# Patient Record
Sex: MALE | Race: WHITE | NOT HISPANIC OR LATINO | URBAN - NONMETROPOLITAN AREA
[De-identification: names, ages, dates, MRNs, and addresses within clinical notes are randomized per-mention and may not be internally consistent; named-entity substitution may affect disease eponyms.]

---

## 2020-08-18 ENCOUNTER — OFFICE (OUTPATIENT)
Dept: URBAN - NONMETROPOLITAN AREA CLINIC 15 | Facility: CLINIC | Age: 55
End: 2020-08-18

## 2020-08-18 VITALS
HEIGHT: 71 IN | SYSTOLIC BLOOD PRESSURE: 115 MMHG | RESPIRATION RATE: 20 BRPM | HEART RATE: 66 BPM | DIASTOLIC BLOOD PRESSURE: 63 MMHG | TEMPERATURE: 96.7 F | WEIGHT: 266 LBS | OXYGEN SATURATION: 96 %

## 2020-08-18 DIAGNOSIS — I25.10 ATHEROSCLEROTIC HEART DISEASE OF NATIVE CORONARY ARTERY WITH: ICD-10-CM

## 2020-08-18 DIAGNOSIS — R76.8 OTHER SPECIFIED ABNORMAL IMMUNOLOGICAL FINDINGS IN SERUM: ICD-10-CM

## 2020-08-18 DIAGNOSIS — E11.9 TYPE 2 DIABETES MELLITUS WITHOUT COMPLICATIONS: ICD-10-CM

## 2020-08-18 DIAGNOSIS — K59.09 OTHER CONSTIPATION: ICD-10-CM

## 2020-08-18 DIAGNOSIS — Z72.0 TOBACCO USE: ICD-10-CM

## 2020-08-18 DIAGNOSIS — N18.4 CHRONIC KIDNEY DISEASE, STAGE 4 (SEVERE): ICD-10-CM

## 2020-08-18 DIAGNOSIS — E66.9 OBESITY, UNSPECIFIED: ICD-10-CM

## 2020-08-18 DIAGNOSIS — J44.9 CHRONIC OBSTRUCTIVE PULMONARY DISEASE, UNSPECIFIED: ICD-10-CM

## 2020-08-18 DIAGNOSIS — Z86.010 PERSONAL HISTORY OF COLONIC POLYPS: ICD-10-CM

## 2020-08-18 PROCEDURE — 99203 OFFICE O/P NEW LOW 30 MIN: CPT | Mod: 95

## 2022-03-10 ENCOUNTER — OFFICE (OUTPATIENT)
Dept: URBAN - NONMETROPOLITAN AREA CLINIC 9 | Facility: CLINIC | Age: 57
End: 2022-03-10

## 2022-03-10 VITALS
DIASTOLIC BLOOD PRESSURE: 65 MMHG | SYSTOLIC BLOOD PRESSURE: 105 MMHG | WEIGHT: 237 LBS | HEART RATE: 70 BPM | HEIGHT: 71 IN | RESPIRATION RATE: 18 BRPM

## 2022-03-10 DIAGNOSIS — E11.9 TYPE 2 DIABETES MELLITUS WITHOUT COMPLICATIONS: ICD-10-CM

## 2022-03-10 DIAGNOSIS — I25.10 ATHEROSCLEROTIC HEART DISEASE OF NATIVE CORONARY ARTERY WITH: ICD-10-CM

## 2022-03-10 DIAGNOSIS — R10.13 EPIGASTRIC PAIN: ICD-10-CM

## 2022-03-10 DIAGNOSIS — K59.00 CONSTIPATION, UNSPECIFIED: ICD-10-CM

## 2022-03-10 DIAGNOSIS — J44.9 CHRONIC OBSTRUCTIVE PULMONARY DISEASE, UNSPECIFIED: ICD-10-CM

## 2022-03-10 DIAGNOSIS — Z72.0 TOBACCO USE: ICD-10-CM

## 2022-03-10 DIAGNOSIS — E66.9 OBESITY, UNSPECIFIED: ICD-10-CM

## 2022-03-10 DIAGNOSIS — N18.4 CHRONIC KIDNEY DISEASE, STAGE 4 (SEVERE): ICD-10-CM

## 2022-03-10 PROCEDURE — 99214 OFFICE O/P EST MOD 30 MIN: CPT | Mod: 95

## 2022-03-10 NOTE — SERVICEHPINOTES
Reji Urbano Jr   is a   56   year old  male   who is here today for  Epigastric pain, nausea, constipation. He describes frequent epigastric pain and nausea. He does note some early satiety. He has a history of colon polyps and is due for surveillance colonoscopy. He is ready to schedule both EGD and colonoscopy today. He notes some intermittent dysphagia. CTA of the neck showed hemodynamically significant narrowing of the proximal left internal carotid artery with a diameter of 60%. Bilateral enhancing parotid gland masses. These are likely lymph nodes. The largest measures 17 mm in maximal diameter. Suggest bilateral carotid ultrasound. He saw vascular surgeon, Dr. Luna in consultation. The patient reports that the plan is to plan surveillance until the stenosis is 70%. Patient reports that he has not had any imaging of his parotids. He is going to have imaging of his cervical spine soon.He reports significant weight loss due to lifestyle changes. He has started cutting out fatty foods. He is trying to exercise. He has a history of chronic kidney disease.  He has had an AV fistula but has not required dialysis as of yet.  His creatinine has actually improved a bit.  He recently had a CT a that showed some flow-limiting stenosis in the carotid arteries as well as some He denies any dysphagia or odynophagia.  He has a remote history of hepatitis C successfully treated with subsequent SVR.  His last HCV  RNA was undetectable.  Recent CMP showed normal AST and ALT.  His alkaline phosphatase was elevated at 170. .

## 2022-03-10 NOTE — SERVICENOTES
This visit was completed via ZOOM due to the restrictions of the COVID-19 pandemic. All issues as below were discussed and addressed.  Patient verbally consented to visit. exam was performed with the assistance Aisha Andrews LPN , who was present in the exam room with patient
Start time:10:45
End time:11:00

## 2022-04-19 ENCOUNTER — ON CAMPUS - OUTPATIENT (OUTPATIENT)
Dept: URBAN - NONMETROPOLITAN AREA HOSPITAL 28 | Facility: HOSPITAL | Age: 57
End: 2022-04-19
Payer: MEDICARE

## 2022-04-19 DIAGNOSIS — D12.0 BENIGN NEOPLASM OF CECUM: ICD-10-CM

## 2022-04-19 DIAGNOSIS — D12.3 BENIGN NEOPLASM OF TRANSVERSE COLON: ICD-10-CM

## 2022-04-19 DIAGNOSIS — R10.13 EPIGASTRIC PAIN: ICD-10-CM

## 2022-04-19 DIAGNOSIS — K62.1 RECTAL POLYP: ICD-10-CM

## 2022-04-19 DIAGNOSIS — R68.81 EARLY SATIETY: ICD-10-CM

## 2022-04-19 DIAGNOSIS — K29.80 DUODENITIS WITHOUT BLEEDING: ICD-10-CM

## 2022-04-19 DIAGNOSIS — R11.0 NAUSEA: ICD-10-CM

## 2022-04-19 DIAGNOSIS — D12.2 BENIGN NEOPLASM OF ASCENDING COLON: ICD-10-CM

## 2022-04-19 DIAGNOSIS — K29.50 UNSPECIFIED CHRONIC GASTRITIS WITHOUT BLEEDING: ICD-10-CM

## 2022-04-19 DIAGNOSIS — Z86.010 PERSONAL HISTORY OF COLONIC POLYPS: ICD-10-CM

## 2022-04-19 PROCEDURE — 45385 COLONOSCOPY W/LESION REMOVAL: CPT | Performed by: INTERNAL MEDICINE

## 2022-04-19 PROCEDURE — 43239 EGD BIOPSY SINGLE/MULTIPLE: CPT | Performed by: INTERNAL MEDICINE

## 2022-06-09 ENCOUNTER — OFFICE (OUTPATIENT)
Dept: URBAN - NONMETROPOLITAN AREA CLINIC 9 | Facility: CLINIC | Age: 57
End: 2022-06-09

## 2022-06-09 VITALS
RESPIRATION RATE: 19 BRPM | DIASTOLIC BLOOD PRESSURE: 68 MMHG | WEIGHT: 222 LBS | SYSTOLIC BLOOD PRESSURE: 117 MMHG | HEART RATE: 80 BPM | HEIGHT: 71 IN

## 2022-06-09 DIAGNOSIS — Z59.41 FOOD INSECURITY: ICD-10-CM

## 2022-06-09 DIAGNOSIS — E11.9 TYPE 2 DIABETES MELLITUS WITHOUT COMPLICATIONS: ICD-10-CM

## 2022-06-09 DIAGNOSIS — N18.4 CHRONIC KIDNEY DISEASE, STAGE 4 (SEVERE): ICD-10-CM

## 2022-06-09 DIAGNOSIS — J44.9 CHRONIC OBSTRUCTIVE PULMONARY DISEASE, UNSPECIFIED: ICD-10-CM

## 2022-06-09 DIAGNOSIS — Z59.819 HOUSING INSTABILITY, HOUSED UNSPECIFIED: ICD-10-CM

## 2022-06-09 DIAGNOSIS — R76.8 OTHER SPECIFIED ABNORMAL IMMUNOLOGICAL FINDINGS IN SERUM: ICD-10-CM

## 2022-06-09 DIAGNOSIS — D12.6 BENIGN NEOPLASM OF COLON, UNSPECIFIED: ICD-10-CM

## 2022-06-09 DIAGNOSIS — I25.10 ATHEROSCLEROTIC HEART DISEASE OF NATIVE CORONARY ARTERY WITH: ICD-10-CM

## 2022-06-09 DIAGNOSIS — K59.09 OTHER CONSTIPATION: ICD-10-CM

## 2022-06-09 PROCEDURE — 99215 OFFICE O/P EST HI 40 MIN: CPT | Mod: 95

## 2022-06-09 SDOH — ECONOMIC STABILITY - FOOD INSECURITY: FOOD INSECURITY: Z59.41

## 2022-06-09 SDOH — ECONOMIC STABILITY - HOUSING INSECURITY: HOUSING INSTABILITY UNSPECIFIED: Z59.819

## 2022-06-09 NOTE — SERVICENOTES
This visit was completed via ZOOM due to the restrictions of the COVID-19 pandemic. All issues as below were discussed and addressed.  Patient verbally consented to visit. exam was performed with the assistance Aisha Andrews LPN , who was present in the exam room with patient
Start time:09:45
End time:10:02

## 2022-06-09 NOTE — SERVICEHPINOTES
Reji Urbano Jr   is a   57   year old  male   who is here today for follow up of epigastric pain, constipation. He  continues to have weight loss  Some of this is for diet change however patient reports significant food insecurity.  He has difficulty getting fresh food in groceries due to his finances.  He reports receiving 1200 dollars per month from disability/social security. He lives in [place] and travels to [place] for his doctors appointments. between travel expenses, rent, utilities, medications, patient reports he only has about 20 dollars per week for food.  He has tried the food pantry with little success.  We had a long discussion about his current mental health.  He apologized for making the statement about self-harm.  He says that he was just frustrated due to increased co-pay and his lack of financial resources.   on further questioning, patient reports that he is concerned about his current living situation.  He has not met with a  or .  He denies having any type of plan for self-harm.  He tells me that it would be of character and against his Yazidism to commit self-harm.  He denies owning a gun.  I told the patient that I would be happy to help in any way possible to ensure that his situation  improves.  I have already spoken with Dr. Shanell Lee who has given his information and telephone number to the  for [facility]. The patient voiced appreciation for our assistance and said he has way too much to do to consider self harm. we also discussed possibility of doing telehealth visits from his home or a nearby clinic.  That would save him on the gas money for probable to and from [place]
latanya Munozter his last visit, he had an EGD and colonoscopy. EGD showed chronic gastritis with negative H pylori. Colonoscopy showed multiple tubular adenomas with recommendation of repeat exam in 3 years esophagus appeared normal on EGD. The antrum had moderate, diffuse erythema without ulceration. There was patchy erythema in the duodenum consistent with duodenitis. It was recommended that he add Pepcid to his PPI. Avoid NSAIDs. latanya pelaez

## 2025-04-11 ENCOUNTER — OFFICE (OUTPATIENT)
Dept: URBAN - NONMETROPOLITAN AREA CLINIC 5 | Facility: CLINIC | Age: 60
End: 2025-04-11

## 2025-04-11 VITALS
SYSTOLIC BLOOD PRESSURE: 139 MMHG | WEIGHT: 239 LBS | RESPIRATION RATE: 17 BRPM | DIASTOLIC BLOOD PRESSURE: 82 MMHG | HEART RATE: 76 BPM | HEIGHT: 71 IN

## 2025-04-11 DIAGNOSIS — Z86.0101 PERSONAL HISTORY OF ADENOMATOUS AND SERRATED COLON POLYPS: ICD-10-CM
